# Patient Record
Sex: MALE | Race: BLACK OR AFRICAN AMERICAN
[De-identification: names, ages, dates, MRNs, and addresses within clinical notes are randomized per-mention and may not be internally consistent; named-entity substitution may affect disease eponyms.]

---

## 2019-07-10 ENCOUNTER — HOSPITAL ENCOUNTER (OUTPATIENT)
Dept: HOSPITAL 47 - RADMRIMAIN | Age: 39
Discharge: HOME | End: 2019-07-10
Attending: ORTHOPAEDIC SURGERY
Payer: COMMERCIAL

## 2019-07-10 DIAGNOSIS — S83.512A: Primary | ICD-10-CM

## 2019-07-10 DIAGNOSIS — S89.82XA: ICD-10-CM

## 2019-07-10 NOTE — MR
EXAMINATION TYPE: MR knee LT wo con

 

DATE OF EXAM: 7/10/2019

 

COMPARISON: Outside left knee x-ray 5 days ago.

 

HISTORY: Lt knee pain per order. Fall injury 2 weeks ago with pain and swelling for patient.

 

TECHNIQUE: 

Multiplanar, multisequence images of the knee is performed without IV contrast.

 

FINDINGS:

 

MEDIAL MENISCUS: Anterior and posterior horns are intact without tear.

 

LATERAL MENISCUS: Anterior and posterior horns are intact without tear.

 

CRUCIATE LIGAMENTS: The posterior cruciate ligament is intact and unremarkable. There is artifact fro
m prior ACL repair surgery with recurrent tear as distal fibers are now absent.

 

COLLATERAL LIGAMENTS: The medial collateral ligament and lateral collateral ligament complex are inta
ct. Increased fluid surrounds the medial collateral ligament.

 

EXTENSOR MECHANISM: Visualized quadriceps and patellar tendons are intact.

 

EFFUSION:  No significant suprapatellar joint effusion.

 

POPLITEAL CYST: There is tiny popliteal/baker cyst on axial image 11.

 

TRICOMPARTMENT SPACES: Mild/moderate tricompartment joint space loss with mild to moderate spurring m
ost prominent medial and lateral tibiofemoral compartments. 

 

CARTILAGE:Thinning of articular cartilage medial tibial femoral compartment is present.

 

BONE MARROW SIGNAL:  Areas of heterogeneous diminished T1 and increased T2 signal distal medial femor
al condyle at the site of most prominent cartilaginous loss.

 

OTHER:  No additional significant abnormality is appreciated.

 

IMPRESSION: 

1. Recurrent full thickness ACL tear felt present.

2. Mild MCL strain injury.

3. Mild to borderline moderate tricompartment degenerative changes most prominent medial tibiofemoral
 compartment somewhat pronounced for patient's chronologic age.

## 2019-07-29 ENCOUNTER — HOSPITAL ENCOUNTER (OUTPATIENT)
Dept: HOSPITAL 47 - LABPAT | Age: 39
Discharge: HOME | End: 2019-07-29
Attending: ORTHOPAEDIC SURGERY
Payer: COMMERCIAL

## 2019-07-29 DIAGNOSIS — Z01.812: Primary | ICD-10-CM

## 2019-07-29 DIAGNOSIS — M23.92: ICD-10-CM

## 2019-07-29 LAB
ANION GAP SERPL CALC-SCNC: 8 MMOL/L
BASOPHILS # BLD AUTO: 0.1 K/UL (ref 0–0.2)
BASOPHILS NFR BLD AUTO: 1 %
CHLORIDE SERPL-SCNC: 106 MMOL/L (ref 98–107)
CO2 SERPL-SCNC: 28 MMOL/L (ref 22–30)
EOSINOPHIL # BLD AUTO: 0.2 K/UL (ref 0–0.7)
EOSINOPHIL NFR BLD AUTO: 2 %
ERYTHROCYTE [DISTWIDTH] IN BLOOD BY AUTOMATED COUNT: 4.84 M/UL (ref 4.3–5.9)
ERYTHROCYTE [DISTWIDTH] IN BLOOD: 13.5 % (ref 11.5–15.5)
HCT VFR BLD AUTO: 40.9 % (ref 39–53)
HGB BLD-MCNC: 13.6 GM/DL (ref 13–17.5)
LYMPHOCYTES # SPEC AUTO: 4.7 K/UL (ref 1–4.8)
LYMPHOCYTES NFR SPEC AUTO: 39 %
MCH RBC QN AUTO: 28.1 PG (ref 25–35)
MCHC RBC AUTO-ENTMCNC: 33.2 G/DL (ref 31–37)
MCV RBC AUTO: 84.5 FL (ref 80–100)
MONOCYTES # BLD AUTO: 0.8 K/UL (ref 0–1)
MONOCYTES NFR BLD AUTO: 6 %
NEUTROPHILS # BLD AUTO: 6.1 K/UL (ref 1.3–7.7)
NEUTROPHILS NFR BLD AUTO: 51 %
PLATELET # BLD AUTO: 293 K/UL (ref 150–450)
POTASSIUM SERPL-SCNC: 4.1 MMOL/L (ref 3.5–5.1)
SODIUM SERPL-SCNC: 142 MMOL/L (ref 137–145)
WBC # BLD AUTO: 12.1 K/UL (ref 3.8–10.6)

## 2019-07-29 PROCEDURE — 80051 ELECTROLYTE PANEL: CPT

## 2019-07-29 PROCEDURE — 85025 COMPLETE CBC W/AUTO DIFF WBC: CPT

## 2019-07-31 ENCOUNTER — HOSPITAL ENCOUNTER (OUTPATIENT)
Dept: HOSPITAL 47 - RADMRIMAIN | Age: 39
Discharge: HOME | End: 2019-07-31
Attending: ORTHOPAEDIC SURGERY
Payer: COMMERCIAL

## 2019-07-31 DIAGNOSIS — S63.591A: ICD-10-CM

## 2019-07-31 DIAGNOSIS — M25.431: Primary | ICD-10-CM

## 2019-07-31 NOTE — MR
MR right wrist

 

HISTORY: Right wrist pain

 

Multiplanar multisequence imaging through the right wrist

 

Correlation to plain film 7/22/2019

 

There is abnormal thickening, abnormal increased signal associated with the extensor carpi ulnaris te
ndon of the right wrist. Some abnormal increased internal signal is present. There is edema signal pr
esent along the surrounding soft tissues. Fluid is present within the radioulnar joint. There is jackson
a present at the distal ulna. Triangular fibrocartilage is discontinuous. Fluid signal present distal
 to the expected insertion of the triangular fibrocartilage along the proximal carpal row medially. S
mall ossific density is present at the level of the distal aspect of the ulnar styloid as noted on pl
ain film. Scapholunate, lunotriquetral been sent to be intact.

 

IMPRESSION: Triangular fibrocartilage rupture. Possible tendinosis or posttraumatic changes to the ex
tensor carpi ulnaris tendon, no moustapha rupture. There is joint effusion present as described.

## 2019-08-05 NOTE — HP
HISTORY AND PHYSICAL



CHIEF COMPLAINT:

Left knee pain.



HISTORY OF PRESENT ILLNESS:

The patient is a 38-year-old  who presents with left knee pain after an

injury at work on 06/26/2019.  He tripped over a bolt on the floor at work, twisting

his left knee.  He has had pain and swelling ever since.  He also notes giving way and

instability.  He has history of a left knee ACL reconstruction in 2009.



PAST MEDICAL HISTORY:

Significant for hypertension and attention deficit disorder.



PAST SURGICAL HISTORY:

Significant for previous left knee ACL reconstruction.



CURRENT MEDICATIONS:

Ibuprofen and Adderall.



ALLERGIES:

He denies drug allergies.



FAMILY HISTORY:

Family history is noncontributory.



SOCIAL HISTORY:

Significant for social alcohol use.



REVIEW OF SYSTEMS:

Sixteen point review of systems otherwise reviewed and is noncontributory.



PHYSICAL EXAMINATION:

On examination, the patient is approximately 5 feet, 11 inches, 266 pounds of

endomorphic habitus.  HEENT exam is nonfocal. Neck is supple. He has painless passive

motion of his left hip.  Straight leg raise is negative.  Active motion left knee -8 to

125 degrees of flexion.  He is tender about the medial joint line.  He has mild

effusion.  Collaterals are stable, Lachman's 1+ with a soft endpoint.  Pivot shift is

positive.  Yonathan's elicits medial pain.  His distal neurovascular exam appears

intact in the left lower extremity.



MRI report left knee 07/10/2019 shows a questionable posterior medial meniscal tear

along with recurrent ACL rupture.  Moderate degenerative changes are noted in all 3

compartments.



IMPRESSION:

Left knee internal derangement with possible medial meniscal tear in addition to

recurrent ACL rupture.



RECOMMENDATIONS:

I talked to the patient at length regarding his condition and treatment options.  At

this point, he is having significant instability along with pain and mechanical

symptoms.  After thorough discussion, he opts to proceed with surgery.  We will plan to

proceed with arthroscopic evaluation with possible partial medial meniscectomy in

addition to ACL reconstruction.  Graft options were discussed.  At this point, he opts

to proceed with allograft tissue.  We will potentially perform that as an outpatient

procedure.  Risks and benefits were discussed at length in layman's terms.





MMODL / IJN: [id number] / Job#: [id number]

## 2019-08-06 ENCOUNTER — HOSPITAL ENCOUNTER (OUTPATIENT)
Dept: HOSPITAL 47 - OR | Age: 39
Discharge: HOME | End: 2019-08-06
Attending: ORTHOPAEDIC SURGERY
Payer: COMMERCIAL

## 2019-08-06 VITALS — DIASTOLIC BLOOD PRESSURE: 65 MMHG | HEART RATE: 847 BPM | SYSTOLIC BLOOD PRESSURE: 104 MMHG

## 2019-08-06 VITALS — RESPIRATION RATE: 16 BRPM

## 2019-08-06 VITALS — BODY MASS INDEX: 35.2 KG/M2

## 2019-08-06 VITALS — TEMPERATURE: 97.3 F

## 2019-08-06 DIAGNOSIS — S83.242A: ICD-10-CM

## 2019-08-06 DIAGNOSIS — I10: ICD-10-CM
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
## 2019-08-06 DIAGNOSIS — S83.512A: Primary | ICD-10-CM

## 2019-08-06 DIAGNOSIS — Z79.1: ICD-10-CM

## 2019-08-06 DIAGNOSIS — F98.8: ICD-10-CM

## 2019-08-06 DIAGNOSIS — Z79.899: ICD-10-CM
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
## 2019-08-06 PROCEDURE — 73560 X-RAY EXAM OF KNEE 1 OR 2: CPT

## 2019-08-06 PROCEDURE — 29881 ARTHRS KNE SRG MNISECTMY M/L: CPT

## 2019-08-06 PROCEDURE — 29888 ARTHRS AID ACL RPR/AGMNTJ: CPT

## 2019-08-06 RX ADMIN — HYDROMORPHONE HYDROCHLORIDE PRN MG: 1 INJECTION, SOLUTION INTRAMUSCULAR; INTRAVENOUS; SUBCUTANEOUS at 11:57

## 2019-08-06 RX ADMIN — HYDROMORPHONE HYDROCHLORIDE PRN MG: 1 INJECTION, SOLUTION INTRAMUSCULAR; INTRAVENOUS; SUBCUTANEOUS at 12:02

## 2019-08-06 NOTE — P.OP
Date of Procedure: 08/06/19


Preoperative Diagnosis: 


Recurrent left ACL rupture


Postoperative Diagnosis: 


Same in addition to anterior horn medial meniscal tear


Procedure(s) Performed: 


Left knee arthroscopic ACL reconstruction utilizing allograft bone tendon bone/ 

partial medial meniscectomy


Implants: 


Arthrex 8 mm x 20 mm interference screw 2


Anesthesia: BILL


Surgeon: Ariel Rosado


Assistant #1: Cayetano Bolton


Estimated Blood Loss (ml): 20


Pathology: none sent


Condition: stable


Disposition: PACU


Indications for Procedure: 


The patient's a 38-year-old male who presents after injury at work recently to 

his left knee.  Upon evaluation he was noted have evidence of rupture of his ACL

reconstruction.  He is having persistent pain mechanical symptoms and 

instability after this injury.  He discussion of the risks and benefits of 

operative intervention versus continued conservative measures was made with the 

patient.  He opted to proceed with surgery.  Operative risks to include 

infection, neurovascular injury, development of blood clots, possible ligament 

rerupture, possible postoperative stiffness and need for subsequent procedures 

was discussed.  Informed consent was obtained.


Operative Findings: 


As below


Description of Procedure: 


The patient was brought to the operating room, and after induction of general 

anesthesia examined the left knee.  Collaterals were stable, Lachman was 2+, piv

ot shift was positive.  The left lower extremity was prepped and draped in 

normal fashion.  A superior lateral portals made through a 3 mm skin incision 

superior and lateral to the patella.  This was used for outflow.  A lateral 

portals made through a 5 mm vertical skin incision lateral to the patella tendon

above the joint line.  Diagnostic arthroscopy was performed.  A medial portals 

made through a similar incision medial to the patella tendon above the joint 

line.  On inspection the medial meniscus, previous partial posterior medial 

meniscectomy was noted.  This appeared to be intact.  There was an oblique tear 

involving the anterior horn of the medial meniscus in the white-red junction.  

This was debrided back to stable base with a motorized shaver.  Grade 2 chondral

changes were noted in the medial compartment.  On inspection of the notch, the 

previous ACL graft appeared to be ruptured.  This tissue was debrided with 

motorized shaver.  The previous femoral tunnel appeared somewhat vertical.  On 

inspection of the lateral compartment, there were grade 2-3 degenerative changes

however no loose chondral fragments.  The lateral meniscus was stable and 

intact.  On inspection of the patellofemoral articulation, grade 2-3 chondral 

changes were noted diffusely.  The gutters were clear debris.  The soft tissue 

on the lateral wall was then debrided with motorized shaver and electrocautery 

clearly defining the posterior wall.  An accessory low medial portal was made 

through a 4 mm skin incision for femoral tunnel placement.  A 7 mm over-the-top 

guide was placed in approximately the 2:30 position on the distal femur.  A 

guidewire was then inserted exiting the lateral thigh with the knee hyperflexed.

 A 10 mm reamer was utilized to drill the femoral tunnel to a depth of 25 mm.  

The posterior wall was inspected and was intact as well as the lateral cortex.  

The tibial tunnel was then planned entering the joint along the medial tibial 

eminence 7 mm anterior to the posterior cruciate ligament.  A 10 mm tunnel was 

drilled with the guide set at 55.  The soft tissue and bony debris was then 

removed.  The tibial tunnel was dilated 10.5 mm.  An allograft bone tendon bone 

ACL graft was then prepared.  The tibial and femoral bone blocks were 10 mm.  

Overall length was 100 mm.  2 drill holes were placed in the femoral bone block 

and 3 in the tibial bone block and #2 Ethibond suture was passed.  The graft was

then placed in a retrograde fashion.  The femoral bone plug was fully in the 

tunnel.  With the knee hyperflexed a guidewire was then placed for interferent 

screw placement.  I did notch.  An 8 x 20 mm femoral interferent screw was 

placed with the knee in hyperflexion.  Good purchase was obtained.  The knee was

taken through range of motion.  I had no significant with impingement.  With the

knee in extension I then tensioned the graft and placed a guidewire for the 

tibial interference screw.  I did tap.  An 8 x 20 mm interference screw was 

inserted.  Good purchase was obtained.  Final arthroscopic view showed adequate 

cement of the graft and overall tension.  The subcutaneous tissues were then 

reapproximated interrupted 2-0 Vicryl sutures.  Skin was reprepped with 3-0 

simple nylon sutures.  The portals were closed with Steri-Strips.  A sterile 

dressing was applied in addition to a knee immobilizer.  The patient was awoken 

from general anesthesia and transferred to recovery room in good condition.  

Blood loss was estimated 20 mL.  No complications were incurred.  Sponge and 

needle counts were correct at the end of the case.

## 2019-08-06 NOTE — XR
EXAMINATION TYPE: XR knee limited LT

 

DATE OF EXAM: 8/6/2019

 

CLINICAL HISTORY: Postoperative evaluation

 

TECHNIQUE: 2 views of the left knee are obtained.

 

COMPARISON: None.

 

FINDINGS:  There is no acute fracture/dislocation evident in left knee. Mild to moderate tricompartme
ntal arthropathy is seen with small marginal osteophytes and medial compartment joint space narrowing
. Postsurgical change from prior ACL repair. New postsurgical subcutaneous emphysema and soft tissue 
swelling. Alignment is maintained of the left knee.

 

IMPRESSION: Postsurgical subcutaneous edema and soft tissue swelling. Alignment is maintained of the 
left knee.
n/a

## 2021-05-17 ENCOUNTER — HOSPITAL ENCOUNTER (OUTPATIENT)
Dept: HOSPITAL 47 - RADXRMAIN | Age: 41
Discharge: HOME | End: 2021-05-17
Attending: PHYSICIAN ASSISTANT
Payer: COMMERCIAL

## 2021-05-17 DIAGNOSIS — Q78.9: Primary | ICD-10-CM

## 2021-05-18 NOTE — XR
EXAMINATION TYPE: XR knee complete LT

 

DATE OF EXAM: 5/17/2021

 

CLINICAL HISTORY: Recently increased pain. ACL repair in 2019

 

TECHNIQUE:  Three views of the left knee are obtained.

 

COMPARISON: 8/6/2019

 

FINDINGS:  There is no acute fracture/dislocation evident in left knee. There is mild to moderate kurtis
rowing of the medial, lateral, and patellofemoral compartment joint spaces with osteophytes compartme
nt joint space suggestive of osteoarthritis. Postsurgical changes of ACL repair. The overlying soft t
issue appears unremarkable.

 

IMPRESSION:  There is no acute fracture or dislocation in the left knee. Postsurgical changes of ACL 
repair. Mild to moderate osteophytosis of the left knee.

## 2021-07-24 ENCOUNTER — HOSPITAL ENCOUNTER (EMERGENCY)
Dept: HOSPITAL 47 - EC | Age: 41
Discharge: HOME | End: 2021-07-24
Payer: COMMERCIAL

## 2021-07-24 VITALS
DIASTOLIC BLOOD PRESSURE: 91 MMHG | SYSTOLIC BLOOD PRESSURE: 143 MMHG | RESPIRATION RATE: 18 BRPM | TEMPERATURE: 98 F | HEART RATE: 79 BPM

## 2021-07-24 DIAGNOSIS — M25.512: Primary | ICD-10-CM

## 2021-07-24 DIAGNOSIS — K21.00: ICD-10-CM

## 2021-07-24 LAB
ALBUMIN SERPL-MCNC: 4.4 G/DL (ref 3.5–5)
ALP SERPL-CCNC: 92 U/L (ref 38–126)
ALT SERPL-CCNC: 30 U/L (ref 4–49)
ANION GAP SERPL CALC-SCNC: 9 MMOL/L
AST SERPL-CCNC: 37 U/L (ref 17–59)
BASOPHILS # BLD AUTO: 0.1 K/UL (ref 0–0.2)
BASOPHILS NFR BLD AUTO: 1 %
BUN SERPL-SCNC: 16 MG/DL (ref 9–20)
CALCIUM SPEC-MCNC: 9.8 MG/DL (ref 8.4–10.2)
CHLORIDE SERPL-SCNC: 106 MMOL/L (ref 98–107)
CO2 SERPL-SCNC: 27 MMOL/L (ref 22–30)
EOSINOPHIL # BLD AUTO: 0.3 K/UL (ref 0–0.7)
EOSINOPHIL NFR BLD AUTO: 3 %
ERYTHROCYTE [DISTWIDTH] IN BLOOD BY AUTOMATED COUNT: 5.02 M/UL (ref 4.3–5.9)
ERYTHROCYTE [DISTWIDTH] IN BLOOD: 12.7 % (ref 11.5–15.5)
GLUCOSE SERPL-MCNC: 125 MG/DL (ref 74–99)
HCT VFR BLD AUTO: 42.4 % (ref 39–53)
HGB BLD-MCNC: 14.7 GM/DL (ref 13–17.5)
LYMPHOCYTES # SPEC AUTO: 5.9 K/UL (ref 1–4.8)
LYMPHOCYTES NFR SPEC AUTO: 52 %
MCH RBC QN AUTO: 29.3 PG (ref 25–35)
MCHC RBC AUTO-ENTMCNC: 34.6 G/DL (ref 31–37)
MCV RBC AUTO: 84.5 FL (ref 80–100)
MONOCYTES # BLD AUTO: 0.7 K/UL (ref 0–1)
MONOCYTES NFR BLD AUTO: 6 %
NEUTROPHILS # BLD AUTO: 4.3 K/UL (ref 1.3–7.7)
NEUTROPHILS NFR BLD AUTO: 37 %
PLATELET # BLD AUTO: 279 K/UL (ref 150–450)
POTASSIUM SERPL-SCNC: 3.9 MMOL/L (ref 3.5–5.1)
PROT SERPL-MCNC: 7.6 G/DL (ref 6.3–8.2)
SODIUM SERPL-SCNC: 142 MMOL/L (ref 137–145)
WBC # BLD AUTO: 11.5 K/UL (ref 3.8–10.6)

## 2021-07-24 PROCEDURE — 85025 COMPLETE CBC W/AUTO DIFF WBC: CPT

## 2021-07-24 PROCEDURE — 80053 COMPREHEN METABOLIC PANEL: CPT

## 2021-07-24 PROCEDURE — 36415 COLL VENOUS BLD VENIPUNCTURE: CPT

## 2021-07-24 PROCEDURE — 84484 ASSAY OF TROPONIN QUANT: CPT

## 2021-07-24 PROCEDURE — 93005 ELECTROCARDIOGRAM TRACING: CPT

## 2021-07-24 PROCEDURE — 99284 EMERGENCY DEPT VISIT MOD MDM: CPT

## 2021-07-24 NOTE — XR
EXAMINATION TYPE: XR shoulder complete LT

 

DATE OF EXAM: 7/24/2021

 

COMPARISON: NONE

 

HISTORY: Pain

 

TECHNIQUE: 3 views

 

FINDINGS: I see no fracture nor dislocation. Glenohumeral joint is intact. There are no pathologic ca
lcifications.

 

IMPRESSION: Negative left shoulder exam.

## 2021-07-24 NOTE — ED
General Adult HPI





- General


Chief complaint: Extremity Problem,Nontraumatic


Stated complaint: lt shoulder pain


Time Seen by Provider: 07/24/21 18:32


Source: patient


Mode of arrival: ambulatory


Limitations: no limitations





- History of Present Illness


Initial comments: 


Dictation was produced using dragon dictation software. please excuse any 

grammatical, word or spelling errors. 











Chief Complaint: 40-year-old male with 1 month of left shoulder pain and 

indigestion.





History of Present Illness: 40-year-old male presents emergency department for 

chief complaint of one month of left shoulder pain and 1 day of indigestion.  

Patient states he had some pizza yesterday when today he also felt like he was 

belching frequently and expressing symptoms of reflux.  Patient states he has 

one month of left shoulder pain worse with abduction.  Patient denies any trauma

to the left shoulder.  Denies any fever, chills or night sweats.  Patient has no

medical history of diabetes, hypertension and high cholesterol or coronary 

artery disease.  Denies any substernal chest pressure.  No associated 

diaphoresis or nausea.





The ROS documented in this emergency department record has been reviewed and 

confirmed by me.  Those systems with pertinent positive or negative responses 

have been documented in the HPI.  All other systems are other negative and/or 

noncontributory.








PHYSICAL EXAM:


General Impression: Alert and oriented x3, not in acute distress


HEENT: Normocephalic atraumatic, extra-ocular movements intact, pupils equal and

reactive to light bilaterally, mucous membranes moist.


Cardiovascular: Heart regular rate and rhythm


Chest: Able to complete full sentences, no retractions, no tachypnea


Abdomen: abdomen soft, non-tender, non-distended, no organomegaly


Musculoskeletal: Pulses present and equal in all extremities, no peripheral 

edema


Shoulder: Pain with active external rotation, pain elicited with downward 

pressure to a injury on a rotated left shoulder


Motor:  no focal deficits noted


Neurological: CN II-XII grossly intact, no focal motor or sensory deficits noted


Skin: Intact with no visualized rashes


Psych: Normal affect and mood





ED course: 40-year-old male with clinical presentation consistent with 

musculoskeletal left shoulder pain and 1 day of reflux symptoms.  Vital signs 

upon arrival are within acceptable limits.  EKG does not showany signs of 

ischemia or infarction.  There are some PVCs noted.


Laboratory evaluation obtained.  CBC, metabolic panel is unremarkable. troponin 

negative.  Shoulder x-ray shows no acute processes.  Patient feels improved 

after GI cocktail.  Patient be discharged.  Is given outpatient referral to 

orthopedic surgery and prescription for Protonix.





EKG interpretation: Ventricular rate 75, sinus rhythm,.  162, care 78, QTc 437. 

No OK prolongation, no QTC prolongation, no ST or T-wave changes noted. .  

Overall, this EKG is unremarkable











- Related Data


                                  Previous Rx's











 Medication  Instructions  Recorded


 


Ibuprofen [Motrin] 600 mg PO Q8HR PRN #30 tab 06/26/19


 


HYDROcodone/APAP 7.5-325MG [Norco 1 each PO Q6HR PRN #28 tab 08/06/19





7.5]  


 


Pantoprazole [Protonix] 40 mg PO DAILY #24 tablet. 07/24/21











                                    Allergies











Allergy/AdvReac Type Severity Reaction Status Date / Time


 


No Known Allergies Allergy   Verified 07/24/21 18:30














Review of Systems


ROS Statement: 


Those systems with pertinent positive or pertinent negative responses have been 

documented in the HPI.





ROS Other: All systems not noted in ROS Statement are negative.





Past Medical History


Past Medical History: No Reported History


Additional Past Medical History / Comment(s): b/p up since lt knee injury


History of Any Multi-Drug Resistant Organisms: None Reported


Past Surgical History: Orthopedic Surgery


Additional Past Surgical History / Comment(s): ACL left


Past Anesthesia/Blood Transfusion Reactions: No Reported Reaction


Past Psychological History: No Psychological Hx Reported


Smoking Status: Never smoker


Past Alcohol Use History: Occasional


Past Drug Use History: None Reported





- Past Family History


  ** Mother


Family Medical History: No Reported History





General Exam


Limitations: no limitations





Course


                                   Vital Signs











  07/24/21 07/24/21 07/24/21





  18:27 19:17 19:48


 


Temperature 98.2 F  


 


Pulse Rate 78 85 81


 


Respiratory 16 20 20





Rate   


 


Blood Pressure 170/94 161/104 149/98


 


O2 Sat by Pulse 97 97 96





Oximetry   














Medical Decision Making





- Lab Data


Result diagrams: 


                                 07/24/21 18:50





                                 07/24/21 18:50


                                   Lab Results











  07/24/21 07/24/21 07/24/21 Range/Units





  18:50 18:50 18:50 


 


WBC  11.5 H    (3.8-10.6)  k/uL


 


RBC  5.02    (4.30-5.90)  m/uL


 


Hgb  14.7    (13.0-17.5)  gm/dL


 


Hct  42.4    (39.0-53.0)  %


 


MCV  84.5    (80.0-100.0)  fL


 


MCH  29.3    (25.0-35.0)  pg


 


MCHC  34.6    (31.0-37.0)  g/dL


 


RDW  12.7    (11.5-15.5)  %


 


Plt Count  279    (150-450)  k/uL


 


MPV  7.0    


 


Sodium   142   (137-145)  mmol/L


 


Potassium   3.9   (3.5-5.1)  mmol/L


 


Chloride   106   ()  mmol/L


 


Carbon Dioxide   27   (22-30)  mmol/L


 


Anion Gap   9   mmol/L


 


BUN   16   (9-20)  mg/dL


 


Creatinine   0.86   (0.66-1.25)  mg/dL


 


Est GFR (CKD-EPI)AfAm   >90   (>60 ml/min/1.73 sqM)  


 


Est GFR (CKD-EPI)NonAf   >90   (>60 ml/min/1.73 sqM)  


 


Glucose   125 H   (74-99)  mg/dL


 


Calcium   9.8   (8.4-10.2)  mg/dL


 


Total Bilirubin   0.3   (0.2-1.3)  mg/dL


 


AST   37   (17-59)  U/L


 


ALT   30   (4-49)  U/L


 


Alkaline Phosphatase   92   ()  U/L


 


Troponin I    <0.012  (0.000-0.034)  ng/mL


 


Total Protein   7.6   (6.3-8.2)  g/dL


 


Albumin   4.4   (3.5-5.0)  g/dL














Disposition


Clinical Impression: 


 Reflux esophagitis, Shoulder pain





Disposition: HOME SELF-CARE


Condition: Good


Instructions (If sedation given, give patient instructions):  Gastroesophageal 

Reflux Disease (ED), Rotator Cuff Injury (ED)


Prescriptions: 


Pantoprazole [Protonix] 40 mg PO DAILY #24 tablet.dr


Is patient prescribed a controlled substance at d/c from ED?: No


Referrals: 


Brayan Gonzalez MD [Primary Care Provider] - 1-2 days


Douglas Amaya MD [STAFF PHYSICIAN] - 1-2 days